# Patient Record
(demographics unavailable — no encounter records)

---

## 2025-03-20 NOTE — HISTORY OF PRESENT ILLNESS
[FreeTextEntry1] : March 18, 2025 Patient referred by emergency room at [facility] Patient woke up last week with painful lumps in the bilateral axilla also noted painful vaginal skin lesion sore in the mouth as well  Treated with bactrim for 3 days, followed by doxycycline which has helped with the axillary lesions On Sunday, went to the ER, could not walk due to pain from the vaginal lesions, touching when walks, very painful  Urgent Care blood tests negative for STIs CRP elevated ESR normal  Doxycycline  Painful on the left sore Evaluated by OB GYN/ER and urgent care Treated with valtrex, HSV negative  No clear preceding infection, although had cough about three weeks ago  Had a cough until last week, not at this time  Mouth lesions as well, no new lesions for the past two days, last one on Satuday HSV negative Mouth sores on lip are still present Feels on the left growing in size  Open sore, vaginally No rectal  Has menses as well today  Patient grew up in [place] from [place], father [place] and [place]  No personal or family history of SLE, RA or behcets  No medications on regular basis other than OCP Diagnosed with SIBO about 8 months ago, treated with rifaximin   in 2024 No history of IBD Right now, taking doxycycline for 3 days, total for 14 days In Urgent care checked for STi, negative including syphyllis and GC No fevers, no rashes No arthritis symptoms

## 2025-03-20 NOTE — REVIEW OF SYSTEMS
[Feeling Poorly] : feeling poorly [As Noted in HPI] : as noted in HPI [Skin Lesions] : skin lesion [Negative] : Heme/Lymph [FreeTextEntry4] : sore on lip

## 2025-03-20 NOTE — ASSESSMENT
[FreeTextEntry1] : 29-year-old woman referred for rheumatology evaluation by the emergency room at [facility].  Patient with acute onset of oral and vaginal ulcers in addition to painful subcutaneous skin lesions in the bilateral axilla.  Initially thought HSV related, evaluated by gynecologist, antibody testing negative, started on Valtrex but with little benefit.  Patient was seen at urgent care, started on doxycycline for possible hidradenitis/folliculitis, status post 3 days with improvement in axillary lesions.  Patient does not report any new skin lesions or oral lesions, however quite symptomatic and still present.  Unclear etiology at this time, possibly postviral given recent URI symptoms, versus aphthous ulcers although quite painful making less likely, versus acute onset of autoimmune etiology such as a connective tissue disease such as systemic lupus or Behcet's, versus infectious such as coxsackie or parvovirus.  At this time, patient continues doxycycline and topical anesthetics, will check further tests today in office including ANTONIO with reflex to serologies, repeat ESR and CRP, HLA-B27, HLA-B 51 for possible Behcet's, in addition to coxsackie antibodies as well as parvovirus.  Patient reports labs in the past suggested possible Hashimoto's, will check thyroid antibodies in addition to TSH as well.

## 2025-03-20 NOTE — PHYSICAL EXAM
[Abnormal Walk] : normal gait [Nail Clubbing] : no clubbing  or cyanosis of the fingernails [Musculoskeletal - Swelling] : no joint swelling seen [Motor Tone] : muscle strength and tone were normal [Oriented To Time, Place, And Person] : oriented to person, place, and time [Impaired Insight] : insight and judgment were intact [Affect] : the affect was normal [Mood] : the mood was normal [General Appearance - In No Acute Distress] : in no acute distress [General Appearance - Alert] : alert [General Appearance - Well-Appearing] : healthy appearing [Sclera] : the sclera and conjunctiva were normal [Neck Appearance] : the appearance of the neck was normal [] : no respiratory distress [Respiration, Rhythm And Depth] : normal respiratory rhythm and effort [Auscultation Breath Sounds / Voice Sounds] : lungs were clear to auscultation bilaterally [Exaggerated Use Of Accessory Muscles For Inspiration] : no accessory muscle use [Heart Rate And Rhythm] : heart rate was normal and rhythm regular [Murmurs] : no murmurs [Edema] : there was no peripheral edema [No Spinal Tenderness] : no spinal tenderness [FreeTextEntry1] : right axilla with erythematous papule with subcutaneous enlargement, nontender. no drainage. labia majora inner surface with erythema annualr ulceration on left x 1 and right x 1

## 2025-03-20 NOTE — PHYSICAL EXAM
[Abnormal Walk] : normal gait [Nail Clubbing] : no clubbing  or cyanosis of the fingernails [Musculoskeletal - Swelling] : no joint swelling seen [Motor Tone] : muscle strength and tone were normal [Oriented To Time, Place, And Person] : oriented to person, place, and time [Impaired Insight] : insight and judgment were intact [Affect] : the affect was normal [Mood] : the mood was normal [General Appearance - Alert] : alert [General Appearance - In No Acute Distress] : in no acute distress [General Appearance - Well-Appearing] : healthy appearing [Sclera] : the sclera and conjunctiva were normal [Neck Appearance] : the appearance of the neck was normal [] : no respiratory distress [Respiration, Rhythm And Depth] : normal respiratory rhythm and effort [Exaggerated Use Of Accessory Muscles For Inspiration] : no accessory muscle use [Auscultation Breath Sounds / Voice Sounds] : lungs were clear to auscultation bilaterally [Heart Rate And Rhythm] : heart rate was normal and rhythm regular [Murmurs] : no murmurs [Edema] : there was no peripheral edema [No Spinal Tenderness] : no spinal tenderness [FreeTextEntry1] : right axilla with erythematous papule with subcutaneous enlargement, nontender. no drainage. labia majora inner surface with erythema annualr ulceration on left x 1 and right x 1

## 2025-03-20 NOTE — ASSESSMENT
[FreeTextEntry1] : 29-year-old woman referred for rheumatology evaluation by the emergency room at [facility].  Patient with acute onset of oral and vaginal ulcers in addition to painful subcutaneous skin lesions in the bilateral axilla.  Initially thought HSV related, evaluated by gynecologist, antibody testing negative, started on Valtrex but with little benefit.  Patient was seen at urgent care, started on doxycycline for possible hidradenitis/folliculitis, status post 3 days with improvement in axillary lesions.  Patient does not report any new skin lesions or oral lesions, however quite symptomatic and still present.  Unclear etiology at this time, possibly postviral given recent URI symptoms, versus aphthous ulcers although quite painful making less likely, versus acute onset of autoimmune etiology such as a connective tissue disease such as systemic lupus or Behcet's, versus infectious such as coxsackie or parvovirus.  At this time, patient continues doxycycline and topical anesthetics, will check further tests today in office including ANTONIO with reflex to serologies, repeat ESR and CRP, HLA-B27, HLA-B 51 for possible Behcet's, in addition to coxsackie antibodies as well as parvovirus.  Patient reports labs in the past suggested possible Hashimoto's, will check thyroid antibodies in addition to TSH as well.

## 2025-03-20 NOTE — HISTORY OF PRESENT ILLNESS
[FreeTextEntry1] : March 18, 2025 Patient referred by emergency room at [facility] Patient woke up last week with painful lumps in the bilateral axilla also noted painful vaginal skin lesion sore in the mouth as well  Treated with bactrim for 3 days, followed by doxycycline which has helped with the axillary lesions On Sunday, went to the ER, could not walk due to pain from the vaginal lesions, touching when walks, very painful  Urgent Care blood tests negative for STIs CRP elevated ESR normal  Doxycycline  Painful on the left sore Evaluated by OB GYN/ER and urgent care Treated with valtrex, HSV negative  No clear preceding infection, although had cough about three weeks ago  Had a cough until last week, not at this time  Mouth lesions as well, no new lesions for the past two days, last one on Satuday HSV negative Mouth sores on lip are still present Feels on the left growing in size  Open sore, vaginally No rectal  Has menses as well today  Patient grew up in [place] from [facility], father [place] and [place]  No personal or family history of SLE, RA or behcets  No medications on regular basis other than OCP Diagnosed with SIBO about 8 months ago, treated with rifaximin   in 2024 No history of IBD Right now, taking doxycycline for 3 days, total for 14 days In Urgent care checked for STi, negative including syphyllis and GC No fevers, no rashes No arthritis symptoms

## 2025-03-27 NOTE — HISTORY OF PRESENT ILLNESS
[FreeTextEntry1] : March 26, 2025 Patient returns for follow up, patient feeling much better than last visit, Patient with resolution of oral, vaginal and axilla lesions No recurrence of lesion Reviewed labs with patient +thyroid antibody positive, TSH normal (known from previous) B51 pending Coxsackie A titers elevated will repeat in a few weeks to assess convalescent titers Stopped doxycycline due to throat pain, now feeling better since stopping  March 18, 2025 Patient referred by emergency room at [facility] Patient woke up last week with painful lumps in the bilateral axilla also noted painful vaginal skin lesion sore in the mouth as well  Treated with bactrim for 3 days, followed by doxycycline which has helped with the axillary lesions On Sunday, went to the ER, could not walk due to pain from the vaginal lesions, touching when walks, very painful  Urgent Care blood tests negative for STIs CRP elevated ESR normal  Doxycycline  Painful on the left sore Evaluated by OB GYN/ER and urgent care Treated with valtrex, HSV negative  No clear preceding infection, although had cough about three weeks ago  Had a cough until last week, not at this time  Mouth lesions as well, no new lesions for the past two days, last one on Satuday HSV negative Mouth sores on lip are still present Feels on the left growing in size  Open sore, vaginally No rectal  Has menses as well today  Patient grew up in [place] from [place], father [place]ese and [place] Island[place]  No personal or family history of SLE, RA or behcets  No medications on regular basis other than OCP Diagnosed with SIBO about 8 months ago, treated with rifaximin   in 2024 No history of IBD Right now, taking doxycycline for 3 days, total for 14 days In Urgent care checked for STi, negative including syphyllis and GC No fevers, no rashes No arthritis symptoms

## 2025-03-27 NOTE — REVIEW OF SYSTEMS
[Feeling Poorly] : feeling poorly [As Noted in HPI] : as noted in HPI [Skin Lesions] : skin lesion [FreeTextEntry4] : sore on lip [Negative] : Integumentary

## 2025-03-27 NOTE — PHYSICAL EXAM
[General Appearance - Alert] : alert [General Appearance - In No Acute Distress] : in no acute distress [General Appearance - Well-Appearing] : healthy appearing [Sclera] : the sclera and conjunctiva were normal [Neck Appearance] : the appearance of the neck was normal [Respiration, Rhythm And Depth] : normal respiratory rhythm and effort [Exaggerated Use Of Accessory Muscles For Inspiration] : no accessory muscle use [Auscultation Breath Sounds / Voice Sounds] : lungs were clear to auscultation bilaterally [Heart Rate And Rhythm] : heart rate was normal and rhythm regular [Murmurs] : no murmurs [Edema] : there was no peripheral edema [No Spinal Tenderness] : no spinal tenderness [Abnormal Walk] : normal gait [Nail Clubbing] : no clubbing  or cyanosis of the fingernails [Musculoskeletal - Swelling] : no joint swelling seen [Motor Tone] : muscle strength and tone were normal [Oriented To Time, Place, And Person] : oriented to person, place, and time [Impaired Insight] : insight and judgment were intact [Affect] : the affect was normal [Mood] : the mood was normal [Examination Of The Oral Cavity] : the lips and gums were normal [Oropharynx] : the oropharynx was normal [] : no rash [Skin Lesions] : no skin lesions [FreeTextEntry1] : No active synovitis of the upper and lower extremities bilaterally.

## 2025-03-27 NOTE — HISTORY OF PRESENT ILLNESS
[FreeTextEntry1] : March 26, 2025 Patient returns for follow up, patient feeling much better than last visit, Patient with resolution of oral, vaginal and axilla lesions No recurrence of lesion Reviewed labs with patient +thyroid antibody positive, TSH normal (known from previous) B51 pending Coxsackie A titers elevated will repeat in a few weeks to assess convalescent titers Stopped doxycycline due to throat pain, now feeling better since stopping  March 18, 2025 Patient referred by emergency room at [facility] Patient woke up last week with painful lumps in the bilateral axilla also noted painful vaginal skin lesion sore in the mouth as well  Treated with bactrim for 3 days, followed by doxycycline which has helped with the axillary lesions On Sunday, went to the ER, could not walk due to pain from the vaginal lesions, touching when walks, very painful  Urgent Care blood tests negative for STIs CRP elevated ESR normal  Doxycycline  Painful on the left sore Evaluated by OB GYN/ER and urgent care Treated with valtrex, HSV negative  No clear preceding infection, although had cough about three weeks ago  Had a cough until last week, not at this time  Mouth lesions as well, no new lesions for the past two days, last one on Satuday HSV negative Mouth sores on lip are still present Feels on the left growing in size  Open sore, vaginally No rectal  Has menses as well today  Patient grew up in [place] from [facility], father [place] and [place]  No personal or family history of SLE, RA or behcets  No medications on regular basis other than OCP Diagnosed with SIBO about 8 months ago, treated with rifaximin   in 2024 No history of IBD Right now, taking doxycycline for 3 days, total for 14 days In Urgent care checked for STi, negative including syphyllis and GC No fevers, no rashes No arthritis symptoms

## 2025-03-27 NOTE — ASSESSMENT
[FreeTextEntry1] : 29-year-old woman returns for rheumatology evaluation. Since last visit, oral and vaginal ulcers in addition to painful subcutaneous skin lesions in the bilateral axilla have since resolved. Initially thought HSV related, evaluated by gynecologist, antibody testing negative, started on Valtrex but with little benefit.  Patient was seen at urgent care, started on doxycycline for possible hidradenitis/folliculitis, status post 3 days with improvement in axillary lesions.  Patient does not report any new skin lesions or oral lesions, however quite symptomatic and still present.  Unclear etiology at this time, likely postviral given recent URI symptoms and self resolution of lesions. Labs to date negative although cosackie A virus titre elevated, will rpeeat in a few weeks to assess convalescent titres.  HLA b51 pending although given self resolution of lesions and ANTONIO negative less likely autoimmune etiology of symptoms. Thyroid antibodies elevated as previously noted in past as per patient, TSH in the normal range.  Patient will follow up in four weeks or sooner as needed if symptoms recur.